# Patient Record
Sex: FEMALE | Race: WHITE | HISPANIC OR LATINO | Employment: FULL TIME | URBAN - METROPOLITAN AREA
[De-identification: names, ages, dates, MRNs, and addresses within clinical notes are randomized per-mention and may not be internally consistent; named-entity substitution may affect disease eponyms.]

---

## 2020-06-18 ENCOUNTER — OFFICE VISIT (OUTPATIENT)
Dept: URGENT CARE | Age: 35
End: 2020-06-18
Payer: COMMERCIAL

## 2020-06-18 VITALS
OXYGEN SATURATION: 97 % | TEMPERATURE: 97.3 F | SYSTOLIC BLOOD PRESSURE: 128 MMHG | WEIGHT: 140 LBS | RESPIRATION RATE: 18 BRPM | BODY MASS INDEX: 24.8 KG/M2 | DIASTOLIC BLOOD PRESSURE: 69 MMHG | HEART RATE: 87 BPM | HEIGHT: 63 IN

## 2020-06-18 DIAGNOSIS — L72.0 EPIDERMOID CYST OF SKIN OF CHEST: Primary | ICD-10-CM

## 2020-06-18 PROCEDURE — G0382 LEV 3 HOSP TYPE B ED VISIT: HCPCS | Performed by: NURSE PRACTITIONER

## 2020-12-23 ENCOUNTER — NURSE TRIAGE (OUTPATIENT)
Dept: OTHER | Facility: OTHER | Age: 35
End: 2020-12-23

## 2020-12-23 DIAGNOSIS — Z20.822 EXPOSURE TO COVID-19 VIRUS: Primary | ICD-10-CM

## 2020-12-23 DIAGNOSIS — Z20.822 EXPOSURE TO COVID-19 VIRUS: ICD-10-CM

## 2020-12-23 PROCEDURE — U0003 INFECTIOUS AGENT DETECTION BY NUCLEIC ACID (DNA OR RNA); SEVERE ACUTE RESPIRATORY SYNDROME CORONAVIRUS 2 (SARS-COV-2) (CORONAVIRUS DISEASE [COVID-19]), AMPLIFIED PROBE TECHNIQUE, MAKING USE OF HIGH THROUGHPUT TECHNOLOGIES AS DESCRIBED BY CMS-2020-01-R: HCPCS | Performed by: FAMILY MEDICINE

## 2020-12-24 LAB — SARS-COV-2 RNA SPEC QL NAA+PROBE: DETECTED

## 2022-02-25 ENCOUNTER — OFFICE VISIT (OUTPATIENT)
Dept: OBGYN CLINIC | Facility: CLINIC | Age: 37
End: 2022-02-25
Payer: COMMERCIAL

## 2022-02-25 VITALS
DIASTOLIC BLOOD PRESSURE: 68 MMHG | BODY MASS INDEX: 28.28 KG/M2 | WEIGHT: 159.6 LBS | HEIGHT: 63 IN | SYSTOLIC BLOOD PRESSURE: 112 MMHG

## 2022-02-25 DIAGNOSIS — R39.9 UTI SYMPTOMS: ICD-10-CM

## 2022-02-25 DIAGNOSIS — Z01.419 ENCOUNTER FOR GYNECOLOGICAL EXAMINATION (GENERAL) (ROUTINE) WITHOUT ABNORMAL FINDINGS: Primary | ICD-10-CM

## 2022-02-25 DIAGNOSIS — Z11.3 SCREEN FOR STD (SEXUALLY TRANSMITTED DISEASE): ICD-10-CM

## 2022-02-25 LAB
BACTERIA UR QL AUTO: NORMAL /HPF
BILIRUB UR QL STRIP: NEGATIVE
CLARITY UR: CLEAR
COLOR UR: NORMAL
GLUCOSE UR STRIP-MCNC: NEGATIVE MG/DL
HGB UR QL STRIP.AUTO: NEGATIVE
HYALINE CASTS #/AREA URNS LPF: NORMAL /LPF
KETONES UR STRIP-MCNC: NEGATIVE MG/DL
LEUKOCYTE ESTERASE UR QL STRIP: NEGATIVE
NITRITE UR QL STRIP: NEGATIVE
NON-SQ EPI CELLS URNS QL MICRO: NORMAL /HPF
PH UR STRIP.AUTO: 6 [PH]
PROT UR STRIP-MCNC: NEGATIVE MG/DL
RBC #/AREA URNS AUTO: NORMAL /HPF
SP GR UR STRIP.AUTO: 1.02 (ref 1–1.03)
UROBILINOGEN UR QL STRIP.AUTO: 0.2 E.U./DL
WBC #/AREA URNS AUTO: NORMAL /HPF

## 2022-02-25 PROCEDURE — 87491 CHLMYD TRACH DNA AMP PROBE: CPT | Performed by: STUDENT IN AN ORGANIZED HEALTH CARE EDUCATION/TRAINING PROGRAM

## 2022-02-25 PROCEDURE — 87591 N.GONORRHOEAE DNA AMP PROB: CPT | Performed by: STUDENT IN AN ORGANIZED HEALTH CARE EDUCATION/TRAINING PROGRAM

## 2022-02-25 PROCEDURE — S0610 ANNUAL GYNECOLOGICAL EXAMINA: HCPCS | Performed by: STUDENT IN AN ORGANIZED HEALTH CARE EDUCATION/TRAINING PROGRAM

## 2022-02-25 PROCEDURE — 87086 URINE CULTURE/COLONY COUNT: CPT | Performed by: STUDENT IN AN ORGANIZED HEALTH CARE EDUCATION/TRAINING PROGRAM

## 2022-02-25 PROCEDURE — 81001 URINALYSIS AUTO W/SCOPE: CPT | Performed by: STUDENT IN AN ORGANIZED HEALTH CARE EDUCATION/TRAINING PROGRAM

## 2022-02-25 PROCEDURE — 87661 TRICHOMONAS VAGINALIS AMPLIF: CPT | Performed by: STUDENT IN AN ORGANIZED HEALTH CARE EDUCATION/TRAINING PROGRAM

## 2022-02-26 LAB — BACTERIA UR CULT: NORMAL

## 2022-03-01 LAB
C TRACH DNA SPEC QL NAA+PROBE: NEGATIVE
N GONORRHOEA DNA SPEC QL NAA+PROBE: NEGATIVE

## 2022-03-02 LAB — T VAGINALIS RRNA SPEC QL NAA+PROBE: NEGATIVE

## 2023-03-01 ENCOUNTER — ANNUAL EXAM (OUTPATIENT)
Dept: OBGYN CLINIC | Facility: CLINIC | Age: 38
End: 2023-03-01

## 2023-03-01 VITALS
WEIGHT: 162.2 LBS | BODY MASS INDEX: 28.74 KG/M2 | DIASTOLIC BLOOD PRESSURE: 64 MMHG | HEIGHT: 63 IN | SYSTOLIC BLOOD PRESSURE: 100 MMHG

## 2023-03-01 DIAGNOSIS — Z01.419 ENCOUNTER FOR GYNECOLOGICAL EXAMINATION (GENERAL) (ROUTINE) WITHOUT ABNORMAL FINDINGS: Primary | ICD-10-CM

## 2023-03-01 DIAGNOSIS — N97.9 FEMALE INFERTILITY: ICD-10-CM

## 2023-03-01 RX ORDER — ERGOCALCIFEROL 1.25 MG/1
50000 CAPSULE ORAL WEEKLY
COMMUNITY
Start: 2023-01-27

## 2023-03-01 NOTE — PROGRESS NOTES
Leonard Rowland  1985    Assessment and Plan:  Yearly exam without abnormality      -Pap up to date  We reviewed ASCCP guidelines for Pap testing today    -Infertility: encouraged ovulation testing, cessation of smoking for both patient and partner  Fertility labs and TVUS ordered  Discussed need for fertility eval for partner, likely intervention    RTO one year for yearly exam or sooner as needed  CC:  Yearly exam    S:  40 y o  female here for yearly exam      Kortney العلي  LMP 2/10/23  Contraception: None, trying to conceive   Last Pap: 3/2021 NILM     Non-smoker, social drinker  Exercises irregularly    Doing ok overall  Frustrated by continued lack of pregnancy  Did OPKs twice, with one pos test      Her cycles are regular, not heavy or crampy  Sexual activity: She is sexually active without pain, bleeding or dryness  STD testing:  She does not want STD testing today  Family hx of breast cancer: Mat  Aunt, cousin   Family hx of ovarian cancer: dnies  Family hx of colon cancer: denies     Denies hot flushes, dyspareunia, abnormal uterine bleeding, urinary/fecal incontinence, changes in energy levels, mood         Current Outpatient Medications:   •  ergocalciferol (VITAMIN D2) 50,000 units, Take 50,000 Units by mouth once a week, Disp: , Rfl:   •  methylprdenisolone (MEDROL) 4 mg tablet, Dispense one dose pack use as directed (Patient not taking: Reported on 6/18/2020), Disp: 21 tablet, Rfl: 0  Social History     Socioeconomic History   • Marital status: Single     Spouse name: Not on file   • Number of children: Not on file   • Years of education: Not on file   • Highest education level: Not on file   Occupational History   • Not on file   Tobacco Use   • Smoking status: Every Day   • Smokeless tobacco: Never   Substance and Sexual Activity   • Alcohol use: Yes     Comment: SOCIAL    • Drug use: No   • Sexual activity: Yes     Partners: Male     Birth control/protection: None   Other Topics Concern   • Not on file   Social History Narrative   • Not on file     Social Determinants of Health     Financial Resource Strain: Not on file   Food Insecurity: Not on file   Transportation Needs: Not on file   Physical Activity: Not on file   Stress: Not on file   Social Connections: Not on file   Intimate Partner Violence: Not on file   Housing Stability: Not on file     Family History   Problem Relation Age of Onset   • Breast cancer Maternal Aunt    • Breast cancer Cousin       Past Medical History:   Diagnosis Date   • Chronic pain     back pain        Review of Systems   Respiratory: Negative  Cardiovascular: Negative  Gastrointestinal: Negative for constipation and diarrhea  Genitourinary: Negative for difficulty urinating, pelvic pain, vaginal bleeding, vaginal discharge, itching or odor  O:  Blood pressure 100/64, height 5' 3" (1 6 m), weight 73 6 kg (162 lb 3 2 oz), last menstrual period 01/10/2023, currently breastfeeding  Patient appears well and is not in distress  Neck is supple without masses  Breasts are symmetrical without mass, tenderness, nipple discharge, skin changes or adenopathy  Abdomen is soft and nontender without masses  External genitals are normal without lesions or rashes  Urethral meatus and urethra are normal  Bladder is normal to palpation  Vagina is normal without discharge or bleeding  Cervix is normal without discharge or lesion  Uterus is normal, mobile, nontender without palpable mass  Adnexa are normal, nontender, without palpable mass

## 2023-04-04 ENCOUNTER — TELEPHONE (OUTPATIENT)
Dept: OBGYN CLINIC | Facility: CLINIC | Age: 38
End: 2023-04-04

## 2023-04-04 ENCOUNTER — ULTRASOUND (OUTPATIENT)
Dept: OBGYN CLINIC | Facility: CLINIC | Age: 38
End: 2023-04-04

## 2023-04-04 VITALS
SYSTOLIC BLOOD PRESSURE: 108 MMHG | BODY MASS INDEX: 28.53 KG/M2 | HEIGHT: 63 IN | WEIGHT: 161 LBS | DIASTOLIC BLOOD PRESSURE: 62 MMHG

## 2023-04-04 DIAGNOSIS — N91.1 SECONDARY AMENORRHEA: Primary | ICD-10-CM

## 2023-04-04 DIAGNOSIS — N97.9 FEMALE INFERTILITY: ICD-10-CM

## 2023-04-04 NOTE — TELEPHONE ENCOUNTER
Pt asking how long implantation bleeding lasts? Pt saw Dr Gregory Bethea for her US this morning  Please call pt to discuss

## 2023-04-04 NOTE — PROGRESS NOTES
"Assessment/Plan:    SIUP @ 6w1d not c/w LMP; [facility] 2023    Repeat sono 1 week      Subjective      Gayla Robles is a 40 y o   LMP 2023 who presents with amenorrhea and + urine hCG  Light spotting today  Cycle length: regular 28d  Pregnancy testing: none  Pregnancy imaging: not done  Blood type: O positive  Other lab results: none  Past Medical History:   Diagnosis Date   • Chronic pain     back pain       Family History   Problem Relation Age of Onset   • Breast cancer Maternal Aunt    • Breast cancer Cousin          Review of Systems  Pertinent items are noted in HPI  Objective      /62 (BP Location: Left arm, Patient Position: Sitting, Cuff Size: Standard)   Ht 5' 3\" (1 6 m)   Wt 73 kg (161 lb)   LMP 2023 (Exact Date)   BMI 28 52 kg/m²     General: alert and oriented, in no acute distress   Heart: regular rate and rhythm   Lungs: effort normal   Abdomen: soft, non-tender, without masses or organomegaly   Vulva: normal     AMB US Pelvic Non OB    Date/Time: 2023 12:41 PM  Performed by: Collette Plenty, MD  Authorized by: Collette Plenty, MD   Universal Protocol:  Consent: Verbal consent obtained  Risks and benefits: risks, benefits and alternatives were discussed  Consent given by: patient  Time out: Immediately prior to procedure a \"time out\" was called to verify the correct patient, procedure, equipment, support staff and site/side marked as required    Patient understanding: patient states understanding of the procedure being performed  Patient identity confirmed: verbally with patient      Procedure details:     Indications comment:  Amenorrhea, + urine hCG    Technique:  Transvaginal US, Non-OB    Position: lithotomy exam    Uterine findings:     Adnexal mass: not identified      Myomas: not identified    Cervix findings:      closed  Left ovary findings:     Left ovary:  Not visualized    Cysts: not identified    Right ovary findings:     Right " ovary:  Not visualized    Cysts: not identified    Other findings:     Free pelvic fluid: not identified    Post-Procedure Details:     Impression:  Single viable intrauterine gestation CRL 0 39cm (6w1d) NOT c/w EGA of 7w5d by LMP   + flutter  Tolerance:   Tolerated well, no immediate complications    Complications: no complications

## 2023-04-04 NOTE — TELEPHONE ENCOUNTER
Spoke with patient, explained that implantation bleeding is different person to person, some experience and some don't, so this isn't a measurable state, explained to the patient that a TVUS can cause some spotting, educated pt on s/s of m/c and when to call back  But from her [address] today everything looks good, and doctor is aware from her note today that the patient had some spotting  Pt verbalized understanding

## 2023-04-05 ENCOUNTER — TELEPHONE (OUTPATIENT)
Dept: OBGYN CLINIC | Facility: CLINIC | Age: 38
End: 2023-04-05

## 2023-04-05 NOTE — TELEPHONE ENCOUNTER
Reassured patient that this type of spotting after at TVUS is normal, she wilber cramping, LOF, or bright red blood  She states the brown discharge is only when she wipes  Pt needed reassurance due to her past hx  Explained to her that this is normal  But to call us back if bleeding turns to bright red,increases in flow, or she starts to have cramping  Pt verbalized understanding

## 2023-04-05 NOTE — TELEPHONE ENCOUNTER
Pt was seen yesterday for her first ultrasound and is having some brownish mucosy discharge this morning  Is nervous about it  Has f/u ultrasound scheduled for 4/11

## 2023-04-10 NOTE — TELEPHONE ENCOUNTER
Spoke to patient and she has bleeding and cramping ER 4/7  Did abdominal US  She went ot [facility]- I looked in care everywhere - no report final yet  Blood O+  She said after ER- right after she left- has some tissue pass  Bleeding now is scant- no cramping  She wanted to know f/u process  HCG's? - does she need that US tomorrow? When can start trying again? Progesterone? What can she do to prevent- M/c x 3 already

## 2023-04-10 NOTE — TELEPHONE ENCOUNTER
She needs her appt  She has a possible missed or incomplete but documentation not consistent with completed SAB  Needs follow up for a plan

## 2023-04-10 NOTE — TELEPHONE ENCOUNTER
Pt miscarried over the weekend - diagnosed in ER     Wants to know what to do, she was told to f/u with us    sched for US tomorrow - ok to keep? Change to follow up?     Please advise febrile

## 2023-09-28 ENCOUNTER — OFFICE VISIT (OUTPATIENT)
Dept: URGENT CARE | Age: 38
End: 2023-09-28
Payer: COMMERCIAL

## 2023-09-28 VITALS
BODY MASS INDEX: 28.34 KG/M2 | TEMPERATURE: 97.9 F | DIASTOLIC BLOOD PRESSURE: 80 MMHG | HEART RATE: 111 BPM | SYSTOLIC BLOOD PRESSURE: 125 MMHG | RESPIRATION RATE: 20 BRPM | WEIGHT: 160 LBS | OXYGEN SATURATION: 97 %

## 2023-09-28 DIAGNOSIS — U07.1 COVID-19: Primary | ICD-10-CM

## 2023-09-28 LAB
SARS-COV-2 AG UPPER RESP QL IA: POSITIVE
VALID CONTROL: ABNORMAL

## 2023-09-28 PROCEDURE — 87811 SARS-COV-2 COVID19 W/OPTIC: CPT | Performed by: NURSE PRACTITIONER

## 2023-09-28 PROCEDURE — 99213 OFFICE O/P EST LOW 20 MIN: CPT | Performed by: NURSE PRACTITIONER

## 2023-09-28 RX ORDER — DEXAMETHASONE 4 MG/1
4 TABLET ORAL 2 TIMES DAILY WITH MEALS
Qty: 10 TABLET | Refills: 0 | Status: SHIPPED | OUTPATIENT
Start: 2023-09-28

## 2023-09-28 NOTE — PROGRESS NOTES
[facility]        NAME: Dane Jerome is a 40 y.o. female  : 1985    MRN: [id number]  DATE: 2023  TIME: 5:56 PM    Assessment and Plan   COVID-19 [U07.1]  1. COVID-19  Poct Covid 19 Rapid Antigen Test    dexamethasone (DECADRON) 4 mg tablet            Patient Instructions     Rapid covid is positive  Dexamethasone sent for headache  cont with mucinex    Follow up with PCP in 3-5 days  Proceed to  ER if symptoms worsen. Chief Complaint     Chief Complaint   Patient presents with   • Headache     Headache and head cold and congestion since 3 days. History of Present Illness       HPI   Presents to clinic with complaint of cold symptoms for about 3 days. Includes headache, head congestion and runny nose. Some cough. No recent travel or known sick contacts. Review of Systems   Review of Systems   Constitutional: Negative for fever. HENT: Positive for congestion, rhinorrhea and sinus pressure. Negative for ear pain and sore throat. Respiratory: Positive for cough. Negative for shortness of breath and wheezing. Cardiovascular: Negative for chest pain. Gastrointestinal: Negative for diarrhea and vomiting. Skin: Negative for color change, rash and wound. Neurological: Positive for headaches.          Current Medications       Current Outpatient Medications:   •  dexamethasone (DECADRON) 4 mg tablet, Take 1 tablet (4 mg total) by mouth 2 (two) times a day with meals, Disp: 10 tablet, Rfl: 0  •  ergocalciferol (VITAMIN D2) 50,000 units, Take 50,000 Units by mouth once a week, Disp: , Rfl:   •  Prenatal MV-Min-Fe Fum-FA-DHA (PRENATAL 1 PO), Take by mouth Not sure if DHA is in prenatal vit, Disp: , Rfl:     Current Allergies     Allergies as of 2023   • (No Known Allergies)            The following portions of the patient's history were reviewed and updated as appropriate: allergies, current medications, past family history, past medical history, past social history, past surgical history and problem list.     Past Medical History:   Diagnosis Date   • Chronic pain     back pain       No past surgical history on file. Family History   Problem Relation Age of Onset   • Breast cancer Maternal Aunt    • Breast cancer Cousin          Medications have been verified. Objective   /80   Pulse (!) 111   Temp 97.9 °F (36.6 °C) (Temporal)   Resp 20   Wt 72.6 kg (160 lb)   LMP 08/31/2023 (Approximate)   SpO2 97%   BMI 28.34 kg/m²   Patient's last menstrual period was 08/31/2023 (approximate). Physical Exam     Physical Exam  Constitutional:       Appearance: She is not ill-appearing or diaphoretic. HENT:      Right Ear: Tympanic membrane normal.      Left Ear: Tympanic membrane normal.      Nose: Rhinorrhea present. Mouth/Throat:      Pharynx: No posterior oropharyngeal erythema. Comments: Post nasal drip  Cardiovascular:      Rate and Rhythm: Regular rhythm. Heart sounds: Normal heart sounds. Pulmonary:      Effort: Pulmonary effort is normal.      Breath sounds: Normal breath sounds. No wheezing.

## 2023-09-28 NOTE — LETTER
September 28, 2023     Patient: Sindi Rodriguez   YOB: 1985   Date of Visit: 9/28/2023       To Whom it May Concern:    Sindi Rodriguez was seen in my clinic on 9/28/2023. She may return to work on 10/01/2023, if she is fever free and without fever med for 24 hours. Upon her return should wear a mask for 5 days    If you have any questions or concerns, please don't hesitate to call.          Sincerely,          Ione Habermann, CRNP        CC: No Recipients

## 2024-03-23 NOTE — PROGRESS NOTES
Luiz Lockwood  1985    Assessment and Plan:  Yearly exam without abnormality  Problem List Items Addressed This Visit     None      Visit Diagnoses     Encounter for gynecological examination (general) (routine) without abnormal findings    -  Primary    Screen for STD (sexually transmitted disease)        Relevant Orders    Hepatitis B surface antigen    Human Immunodeficiency Virus 1/2 Antigen / Antibody ( Fourth Generation) with Reflex Testing    RPR    Hepatitis C antibody    Chlamydia/GC amplified DNA by PCR    Trichomonas Vaginalis, ABIGAIL    UTI symptoms        Relevant Orders    Urine culture    Urinalysis with microscopic          -Pap up to date  We reviewed ASCCP guidelines for Pap testing today  -STI testing collected/ordered  -recurrent UTI: recommend lubrication for intercourse and UCx today for assessment for baseline asx infection  -recurrent miscarriage and infertility: recommend cessation of smoking, tracking cycles, intercourse and ovulation  RTO 3-4 months for review of menstrual calendar       CC:  Yearly exam    S:  29 y o  female here for yearly exam      Adali Quevedo  Patient's last menstrual period was 01/25/2022 (exact date)  Contraception: none, TTC x3 years  Last Pap: 3/2021 NILM/HPV-   -denies hx abnormal    Current every day smoker, social drinker  Exercises irregularly    Doing ok overall  Is frustrated by inability to become pregnant with current partner  He is 34 and has 3 children, but they have been sexually active, without contraception for 3 years, without pregnancy  She has three prior miscarriages, with a different partner  Denies hot flushes, dyspareunia, abnormal uterine bleeding, urinary/fecal incontinence, changes in energy levels, mood  Her cycles are regular, not heavy or crampy  Sexual activity: She is sexually active without pain, bleeding or dryness  STD testing:  She does want STD testing today       Gardasil:  She is unsure if she has had the Gardasil series  Family hx of breast cancer: maternal aunt and cousin  Family hx of ovarian/colon cancer: denies      Current Outpatient Medications:     methylprdenisolone (MEDROL) 4 mg tablet, Dispense one dose pack use as directed (Patient not taking: Reported on 6/18/2020), Disp: 21 tablet, Rfl: 0  Social History     Socioeconomic History    Marital status: Single     Spouse name: Not on file    Number of children: Not on file    Years of education: Not on file    Highest education level: Not on file   Occupational History    Not on file   Tobacco Use    Smoking status: Current Every Day Smoker    Smokeless tobacco: Never Used   Substance and Sexual Activity    Alcohol use: Yes     Comment: SOCIAL     Drug use: No    Sexual activity: Yes     Partners: Male     Birth control/protection: None   Other Topics Concern    Not on file   Social History Narrative    Not on file     Social Determinants of Health     Financial Resource Strain: Not on file   Food Insecurity: Not on file   Transportation Needs: Not on file   Physical Activity: Not on file   Stress: Not on file   Social Connections: Not on file   Intimate Partner Violence: Not on file   Housing Stability: Not on file     Family History   Problem Relation Age of Onset    Breast cancer Maternal Aunt     Breast cancer Cousin       Past Medical History:   Diagnosis Date    Chronic pain     back pain        Review of Systems   Respiratory: Negative  Cardiovascular: Negative  Gastrointestinal: Negative for constipation and diarrhea  Genitourinary: Negative for difficulty urinating, pelvic pain, vaginal bleeding, vaginal discharge, itching or odor  O:  Blood pressure 112/68, height 5' 3" (1 6 m), weight 72 4 kg (159 lb 9 6 oz), last menstrual period 01/25/2022, currently breastfeeding      Patient appears well and is not in distress  Neck is supple without masses  Breasts are symmetrical without mass, tenderness, nipple discharge, skin changes or adenopathy  Abdomen is soft and nontender without masses  External genitals are normal without lesions or rashes  Urethral meatus and urethra are normal  Bladder is normal to palpation  Vagina is normal without discharge or bleeding  Cervix is normal without discharge or lesion  Uterus is normal, mobile, nontender without palpable mass  Adnexa are normal, nontender, without palpable mass  denies pain/discomfort (Rating = 0)